# Patient Record
Sex: MALE | Race: WHITE | Employment: UNEMPLOYED | URBAN - METROPOLITAN AREA
[De-identification: names, ages, dates, MRNs, and addresses within clinical notes are randomized per-mention and may not be internally consistent; named-entity substitution may affect disease eponyms.]

---

## 2021-09-14 ENCOUNTER — HOSPITAL ENCOUNTER (EMERGENCY)
Age: 31
Discharge: HOME | End: 2021-09-14
Attending: EMERGENCY MEDICINE

## 2021-09-14 VITALS
SYSTOLIC BLOOD PRESSURE: 111 MMHG | HEART RATE: 100 BPM | BODY MASS INDEX: 27.59 KG/M2 | TEMPERATURE: 98.1 F | DIASTOLIC BLOOD PRESSURE: 69 MMHG | HEIGHT: 67 IN | OXYGEN SATURATION: 98 % | WEIGHT: 175.8 LBS | RESPIRATION RATE: 18 BRPM

## 2021-09-14 DIAGNOSIS — K14.6 TONGUE PAIN: Primary | ICD-10-CM

## 2021-09-14 PROCEDURE — 99282 EMERGENCY DEPT VISIT SF MDM: CPT

## 2021-09-14 NOTE — ED TRIAGE NOTES
Pt reports tongue swelling and pain x 2 days. Asking for directions and bus schedule to [facility] for accommodations.

## 2021-09-14 NOTE — PROGRESS NOTES
@ 1130 Pt the pt at this request. He stated that he spoke with the [facility] this morning and wants to know if we can help him get there. States that he called EMS and does not have a vehicle. [place] but has been in [facility] for a few yrs, followed his girlfriend here. She was involved with drugs and was caught with drugs charged with \"possession\" and has spent time in senior living. Was released 4 days ago, admitted to using right after he was released and now looking to \"get clean\". . We then discussed  his current struggles with addiction and the ill effects it has on one's health/wellbeing and how it effects those closest to them. I have provided pt with a \"addiction packet\" . I have put together a packet that is compiled with information about the following :[address], [facility], [facility],  [address] and [facility]. Opportunities for questions/concerns were given. Pt also without shoes, I provided pt with shoes form the closet, gave him a bus pass & informed him that the [address] runs past the [address] and will be by here at 1150. Pt showed the way to the bus stop.

## 2021-09-14 NOTE — ED NOTES
I have reviewed discharge instructions with the patient. The patient verbalized understanding. Patient left ED via Discharge Method: ambulatory to Home with self    Opportunity for questions and clarification provided. Patient given 0 scripts. To continue your aftercare when you leave the hospital, you may receive an automated call from our care team to check in on how you are doing. This is a free service and part of our promise to provide the best care and service to meet your aftercare needs.  If you have questions, or wish to unsubscribe from this service please call [phone]. Thank you for Choosing our [facility].

## 2021-09-14 NOTE — ED PROVIDER NOTES
JOHN Steen is 27 y.o. male who presents to the emergency department for evaluation of tongue pain. He complains of two day history of left sided tongue pain. He is still able to swallow but notes it is painful when food touches the left side of his tongue. He denies any injury. He denies history of HSV. He denies any previous medical history. He has not attempted any OTC treatments. History reviewed. No pertinent past medical history. History reviewed. No pertinent surgical history. History reviewed. No pertinent family history. Social History     Socioeconomic History    Marital status: SINGLE     Spouse name: Not on file    Number of children: Not on file    Years of education: Not on file    Highest education level: Not on file   Occupational History    Not on file   Tobacco Use    Smoking status: Not on file   Substance and Sexual Activity    Alcohol use: Not on file    Drug use: Yes     Types: Methamphetamines    Sexual activity: Not on file   Other Topics Concern    Not on file   Social History Narrative    Not on file     Social Determinants of Health     Financial Resource Strain:     Difficulty of Paying Living Expenses:    Food Insecurity:     Worried About Running Out of Food in the Last Year:     [address] in the Last Year:    Transportation Needs:     Lack of Transportation (Medical):      Lack of Transportation (Non-Medical):    Physical Activity:     Days of Exercise per Week:     Minutes of Exercise per Session:    Stress:     Feeling of Stress :    Social Connections:     Frequency of Communication with Friends and Family:     Frequency of Social Gatherings with Friends and Family:     Attends Taoist Services:     Active Member of Clubs or Organizations:     Attends Club or Organization Meetings:     Marital Status:    Intimate Partner Violence:     Fear of Current or Ex-Partner:     Emotionally Abused:     Physically Abused:     Sexually Abused: ALLERGIES: Pcn [penicillins]    Review of Systems   HENT:        Tongue pain   All other systems reviewed and are negative. Vitals:    09/14/21 0908   BP: 111/69   Pulse: 100   Resp: 18   Temp: 98.1 °F (36.7 °C)   SpO2: 98%   Weight: 79.7 kg (175 lb 12.8 oz)   Height: 5' 7\" (1.702 m)            Physical Exam  Vitals and nursing note reviewed. Constitutional:       Appearance: Normal appearance. HENT:      Mouth/Throat:      Comments: Papules to left lateral aspect of tongue. No redness. No white patches. No vesicular lesions. Pharynx is clear  Cardiovascular:      Rate and Rhythm: Normal rate. Pulmonary:      Effort: Pulmonary effort is normal.   Musculoskeletal:         General: Normal range of motion. Skin:     General: Skin is warm and dry. Neurological:      General: No focal deficit present. Mental Status: He is alert. Psychiatric:         Mood and Affect: Mood normal.          MDM  Number of Diagnoses or Management Options  Tongue pain: new and requires workup  Patient Progress  Patient progress: stable    ED Course as of Sep 14 1048   Tue Sep 14, 2021   1047 The patient was examined with Dr. Donya Kincaid. Recommend tylenol/motrin and maalox/benadryl swish and spit. He is discharged to home. Results, plan of care and return precautions discussed with the patient. They verbalize understanding and ability to comply.        [AE]      ED Course User Index  [AE] MARK Coleman       Procedures

## 2021-09-14 NOTE — DISCHARGE INSTRUCTIONS
Mix Maalox and Benadryl and swish in your mouth and spit to relieve tongue pain. Tylenol and motrin as needed for pain.

## 2022-10-15 ENCOUNTER — HOSPITAL ENCOUNTER (EMERGENCY)
Age: 32
Discharge: HOME | End: 2022-10-15
Attending: EMERGENCY MEDICINE

## 2022-10-15 VITALS
TEMPERATURE: 98.4 F | OXYGEN SATURATION: 99 % | DIASTOLIC BLOOD PRESSURE: 67 MMHG | SYSTOLIC BLOOD PRESSURE: 122 MMHG | HEART RATE: 88 BPM | RESPIRATION RATE: 22 BRPM

## 2022-10-15 DIAGNOSIS — F41.1 ANXIETY STATE: Primary | ICD-10-CM

## 2022-10-15 DIAGNOSIS — F15.10 METHAMPHETAMINE ABUSE (HCC): ICD-10-CM

## 2022-10-15 LAB
ALBUMIN SERPL-MCNC: 3.6 G/DL (ref 3.5–5)
ALBUMIN/GLOB SERPL: 0.9 {RATIO} (ref 0.4–1.6)
ALP SERPL-CCNC: 80 U/L (ref 50–136)
ALT SERPL-CCNC: 37 U/L (ref 12–65)
ANION GAP SERPL CALC-SCNC: 7 MMOL/L (ref 2–11)
AST SERPL-CCNC: 31 U/L (ref 15–37)
BASOPHILS # BLD: 0.1 K/UL (ref 0–0.2)
BASOPHILS NFR BLD: 1 % (ref 0–2)
BILIRUB SERPL-MCNC: 0.2 MG/DL (ref 0.2–1.1)
BUN SERPL-MCNC: 22 MG/DL (ref 6–23)
CALCIUM SERPL-MCNC: 9.5 MG/DL (ref 8.3–10.4)
CHLORIDE SERPL-SCNC: 102 MMOL/L (ref 101–110)
CO2 SERPL-SCNC: 29 MMOL/L (ref 21–32)
CREAT SERPL-MCNC: 1.3 MG/DL (ref 0.8–1.5)
DIFFERENTIAL METHOD BLD: ABNORMAL
EKG ATRIAL RATE: 88 BPM
EKG DIAGNOSIS: NORMAL
EKG P AXIS: 60 DEGREES
EKG P-R INTERVAL: 116 MS
EKG Q-T INTERVAL: 372 MS
EKG QRS DURATION: 96 MS
EKG QTC CALCULATION (BAZETT): 450 MS
EKG R AXIS: 72 DEGREES
EKG T AXIS: 63 DEGREES
EKG VENTRICULAR RATE: 88 BPM
EOSINOPHIL # BLD: 0.2 K/UL (ref 0–0.8)
EOSINOPHIL NFR BLD: 2 % (ref 0.5–7.8)
ERYTHROCYTE [DISTWIDTH] IN BLOOD BY AUTOMATED COUNT: 12.9 % (ref 11.9–14.6)
GLOBULIN SER CALC-MCNC: 3.8 G/DL (ref 2.8–4.5)
GLUCOSE SERPL-MCNC: 150 MG/DL (ref 65–100)
HCT VFR BLD AUTO: 40.9 % (ref 41.1–50.3)
HGB BLD-MCNC: 13.4 G/DL (ref 13.6–17.2)
IMM GRANULOCYTES # BLD AUTO: 0 K/UL (ref 0–0.5)
IMM GRANULOCYTES NFR BLD AUTO: 0 % (ref 0–5)
LYMPHOCYTES # BLD: 2.1 K/UL (ref 0.5–4.6)
LYMPHOCYTES NFR BLD: 22 % (ref 13–44)
MAGNESIUM SERPL-MCNC: 2.2 MG/DL (ref 1.8–2.4)
MCH RBC QN AUTO: 29.3 PG (ref 26.1–32.9)
MCHC RBC AUTO-ENTMCNC: 32.8 G/DL (ref 31.4–35)
MCV RBC AUTO: 89.3 FL (ref 82–102)
MONOCYTES # BLD: 0.5 K/UL (ref 0.1–1.3)
MONOCYTES NFR BLD: 5 % (ref 4–12)
NEUTS SEG # BLD: 6.7 K/UL (ref 1.7–8.2)
NEUTS SEG NFR BLD: 70 % (ref 43–78)
NRBC # BLD: 0 K/UL (ref 0–0.2)
PLATELET # BLD AUTO: 303 K/UL (ref 150–450)
PLATELET COMMENT: ADEQUATE
PMV BLD AUTO: 10.8 FL (ref 9.4–12.3)
POTASSIUM SERPL-SCNC: 4 MMOL/L (ref 3.5–5.1)
PROT SERPL-MCNC: 7.4 G/DL (ref 6.3–8.2)
RBC # BLD AUTO: 4.58 M/UL (ref 4.23–5.6)
RBC MORPH BLD: ABNORMAL
SODIUM SERPL-SCNC: 138 MMOL/L (ref 133–143)
WBC # BLD AUTO: 9.6 K/UL (ref 4.3–11.1)
WBC MORPH BLD: ABNORMAL

## 2022-10-15 PROCEDURE — 96374 THER/PROPH/DIAG INJ IV PUSH: CPT

## 2022-10-15 PROCEDURE — 85025 COMPLETE CBC W/AUTO DIFF WBC: CPT

## 2022-10-15 PROCEDURE — 80053 COMPREHEN METABOLIC PANEL: CPT

## 2022-10-15 PROCEDURE — 83735 ASSAY OF MAGNESIUM: CPT

## 2022-10-15 PROCEDURE — 99284 EMERGENCY DEPT VISIT MOD MDM: CPT

## 2022-10-15 PROCEDURE — 6360000002 HC RX W HCPCS: Performed by: EMERGENCY MEDICINE

## 2022-10-15 PROCEDURE — 93005 ELECTROCARDIOGRAM TRACING: CPT | Performed by: EMERGENCY MEDICINE

## 2022-10-15 PROCEDURE — 96375 TX/PRO/DX INJ NEW DRUG ADDON: CPT

## 2022-10-15 RX ORDER — ONDANSETRON 2 MG/ML
4 INJECTION INTRAMUSCULAR; INTRAVENOUS ONCE
Status: COMPLETED | OUTPATIENT
Start: 2022-10-15 | End: 2022-10-15

## 2022-10-15 RX ORDER — DIAZEPAM 5 MG/ML
5 INJECTION, SOLUTION INTRAMUSCULAR; INTRAVENOUS ONCE
Status: COMPLETED | OUTPATIENT
Start: 2022-10-15 | End: 2022-10-15

## 2022-10-15 RX ADMIN — ONDANSETRON 4 MG: 2 INJECTION INTRAMUSCULAR; INTRAVENOUS at 02:17

## 2022-10-15 RX ADMIN — DIAZEPAM 5 MG: 5 INJECTION, SOLUTION INTRAMUSCULAR; INTRAVENOUS at 02:16

## 2022-10-15 ASSESSMENT — ENCOUNTER SYMPTOMS
DIARRHEA: 0
NAUSEA: 1
BACK PAIN: 0
VOMITING: 0
ABDOMINAL PAIN: 0
SORE THROAT: 0
COUGH: 0
SHORTNESS OF BREATH: 0

## 2022-10-15 ASSESSMENT — PAIN - FUNCTIONAL ASSESSMENT: PAIN_FUNCTIONAL_ASSESSMENT: 0-10

## 2022-10-15 ASSESSMENT — PAIN SCALES - GENERAL: PAINLEVEL_OUTOF10: 0

## 2022-10-15 NOTE — ED TRIAGE NOTES
Patient brought in by EMS. EMS was called to patients work. Patient c\o nausea, dizziness, fatigue that began about an hour ago. Patient denies any pain. Patient states the he \"does not think his brain is working\".     EMS   136/100  110 HR

## 2022-10-15 NOTE — ED NOTES
I have reviewed discharge instructions with the patient. The patient verbalized understanding. Patient left ED via Discharge Method: ambulatory to Home with self. Opportunity for questions and clarification provided. Patient given 0 scripts. To continue your aftercare when you leave the hospital, you may receive an automated call from our care team to check in on how you are doing. This is a free service and part of our promise to provide the best care and service to meet your aftercare needs.  If you have questions, or wish to unsubscribe from this service please call [phone]. Thank you for Choosing our [facility] Emergency Department.         LUZ Pelayo  10/15/22 2079

## 2022-10-15 NOTE — ED PROVIDER NOTES
[employer] Emergency Department Provider Note                   PCP:                None Provider               Age: 34 y.o. Sex: jerri Lanza is a 34 y.o. male who presents to the Emergency Department with chief complaint of    Chief Complaint   Patient presents with    Dizziness      Patient was brought in by EMS for dizziness and nausea. He states that he has been feeling well recently. He states that about an hour ago he started having a lightheaded sensation associated with nausea and confusion. He states he does not feel \"like my brain isn't working\". He states that he started having a tingling sensation to his face, bilateral hands, and feet. He states that he has not been feeling anxious or stressed out. Patient states he started crying while at work but denies feeling depressed or suicidal.  Patient admits that he does use methamphetamines but states it been several days since he used. He states he had a similar episode several weeks ago like this. The history is provided by the patient. All other systems reviewed and are negative. Review of Systems   Constitutional:  Positive for fatigue. Negative for chills and fever. HENT:  Negative for congestion and sore throat. Eyes:  Negative for visual disturbance. Respiratory:  Negative for cough and shortness of breath. Cardiovascular:  Negative for chest pain and palpitations. Gastrointestinal:  Positive for nausea. Negative for abdominal pain, diarrhea and vomiting. Genitourinary:  Negative for dysuria. Musculoskeletal:  Negative for back pain and neck pain. Skin:  Negative for rash. Neurological:  Positive for light-headedness and numbness. Negative for weakness and headaches. Psychiatric/Behavioral:  Negative for dysphoric mood and suicidal ideas. The patient is not nervous/anxious. History reviewed. No pertinent past medical history. History reviewed. No pertinent surgical history. History reviewed. No pertinent family history. Social History     Socioeconomic History    Marital status: Single     Spouse name: None    Number of children: None    Years of education: None    Highest education level: None   Tobacco Use    Smoking status: Never     Passive exposure: Never    Smokeless tobacco: Never   Substance and Sexual Activity    Drug use: Not Currently     Types: Methamphetamines (Crystal Meth)        Allergies: Patient has no known allergies. There are no discharge medications for this patient. Vitals signs and nursing note reviewed. No data found. Physical Exam  Vitals and nursing note reviewed. Constitutional:       Appearance: Normal appearance. HENT:      Head: Normocephalic and atraumatic. Cardiovascular:      Rate and Rhythm: Normal rate and regular rhythm. Pulses: Normal pulses. Heart sounds: Normal heart sounds. Pulmonary:      Effort: Pulmonary effort is normal.      Breath sounds: Normal breath sounds. Abdominal:      General: Abdomen is flat. Bowel sounds are normal.      Palpations: Abdomen is soft. Tenderness: There is no abdominal tenderness. Musculoskeletal:         General: No swelling or tenderness. Cervical back: Normal range of motion and neck supple. No tenderness. Skin:     General: Skin is warm and dry. Neurological:      General: No focal deficit present. Mental Status: He is alert and oriented to person, place, and time. Cranial Nerves: No cranial nerve deficit. Sensory: No sensory deficit. Motor: No weakness. Psychiatric:         Attention and Perception: Attention normal.         Mood and Affect: Mood is anxious. Speech: Speech normal.         Behavior: Behavior normal.         Thought Content: Thought content is delusional. Thought content is not paranoid. Thought content does not include homicidal or suicidal ideation.  Thought content does not include homicidal or suicidal plan.        Orders Placed This Encounter   Procedures    CBC with Auto Differential    Comprehensive Metabolic Panel    Magnesium    Cardiac Monitor - ED Only    Continuous Pulse Oximetry    EKG 12 Lead    Saline lock IV       Procedures    ED EKG Interpretation  EKG was interpreted in the absence of a cardiologist.    Rate: Rate: Normal  EKG Interpretation: EKG Interpretation: sinus rhythm  ST Segments: Normal ST segments - NO STEMI      Results Include:    Recent Results (from the past 24 hour(s))   CBC with Auto Differential    Collection Time: 10/15/22  1:59 AM   Result Value Ref Range    WBC 9.6 4.3 - 11.1 K/uL    RBC 4.58 4.23 - 5.6 M/uL    Hemoglobin 13.4 (L) 13.6 - 17.2 g/dL    Hematocrit 40.9 (L) 41.1 - 50.3 %    MCV 89.3 82.0 - 102.0 FL    MCH 29.3 26.1 - 32.9 PG    MCHC 32.8 31.4 - 35.0 g/dL    RDW 12.9 11.9 - 14.6 %    Platelets 166 702 - 299 K/uL    MPV 10.8 9.4 - 12.3 FL    nRBC 0.00 0.0 - 0.2 K/uL    Seg Neutrophils 70 43 - 78 %    Lymphocytes 22 13 - 44 %    Monocytes 5 4.0 - 12.0 %    Eosinophils % 2 0.5 - 7.8 %    Basophils 1 0.0 - 2.0 %    Immature Granulocytes 0 0.0 - 5.0 %    Segs Absolute 6.7 1.7 - 8.2 K/UL    Absolute Lymph # 2.1 0.5 - 4.6 K/UL    Absolute Mono # 0.5 0.1 - 1.3 K/UL    Absolute Eos # 0.2 0.0 - 0.8 K/UL    Basophils Absolute 0.1 0.0 - 0.2 K/UL    Absolute Immature Granulocyte 0.0 0.0 - 0.5 K/UL    RBC Comment NORMOCYTIC/NORMOCHROMIC      WBC Comment Result Confirmed By Smear      Platelet Comment ADEQUATE      Differential Type AUTOMATED     Comprehensive Metabolic Panel    Collection Time: 10/15/22  1:59 AM   Result Value Ref Range    Sodium 138 133 - 143 mmol/L    Potassium 4.0 3.5 - 5.1 mmol/L    Chloride 102 101 - 110 mmol/L    CO2 29 21 - 32 mmol/L    Anion Gap 7 2 - 11 mmol/L    Glucose 150 (H) 65 - 100 mg/dL    BUN 22 6 - 23 MG/DL    Creatinine 1.30 0.8 - 1.5 MG/DL    Est, Glom Filt Rate >60 >60 ml/min/1.73m2    Calcium 9.5 8.3 - 10.4 MG/DL    Total Bilirubin 0.2 0.2 - 1.1 MG/DL    ALT 37 12 - 65 U/L    AST 31 15 - 37 U/L    Alk Phosphatase 80 50 - 136 U/L    Total Protein 7.4 6.3 - 8.2 g/dL    Albumin 3.6 3.5 - 5.0 g/dL    Globulin 3.8 2.8 - 4.5 g/dL    Albumin/Globulin Ratio 0.9 0.4 - 1.6     Magnesium    Collection Time: 10/15/22  1:59 AM   Result Value Ref Range    Magnesium 2.2 1.8 - 2.4 mg/dL          No orders to display                      ED Course as of 10/15/22 0624   Sat Oct 15, 2022   0418 Patient is resting calmly in bed. He states that he is feeling much better. Patient is no longer anxious and delusional appearing. He is alert and oriented. I explained the results and plan. [CW]      ED Course User Index  [CW] Belia Cruz MD       MDM  Number of Diagnoses or Management Options  Anxiety state  Methamphetamine abuse [facility])  Diagnosis management comments: Patient presented for dizziness and nausea which appear to be from anxiety and delusions from methamphetamine abuse. Patient states that he has used methamphetamines recently although not today. However, based on his presentation I suspect that he did use methamphetamines today. Patient's EKG showed normal sinus rhythm without any acute findings. Patient's labs showed no significant abnormalities. I do not suspect any acute medical conditions. Patient was given Valium and he did calm down and was no longer delusional.  Patient was not have any thoughts of harming himself or others. Patient did not meet criteria for inpatient psychiatric hospitalization. I recommended the patient not use illicit drugs. Patient was discharged home with outpatient substance abuse clinic follow-up. Explanation: The diagnosis and plan as well as the results of any testing (if done) and treatments (if done) today in the emergency department were communicated to the patient and/or their family/caregiver (if present).   The patient/caregiver verbalized understanding and compliance with the treatment plan and reasons to return immediately to the emergency department. All questions were answered at bedside      ICD-10-CM    1. Anxiety state  F41.1       2. Methamphetamine abuse ([facility] [address]  F15.10           DISPOSITION Decision To Discharge 10/15/2022 04:20:08 AM       Voice dictation software was used during the making of this note. This software is not perfect and grammatical and other typographical errors may be present. This note has not been completely proofread for errors.      aYrelis Prince MD  10/15/22 3851